# Patient Record
Sex: MALE | Race: WHITE | NOT HISPANIC OR LATINO | URBAN - METROPOLITAN AREA
[De-identification: names, ages, dates, MRNs, and addresses within clinical notes are randomized per-mention and may not be internally consistent; named-entity substitution may affect disease eponyms.]

---

## 2017-01-01 ENCOUNTER — AMBULATORY - HEALTHEAST (OUTPATIENT)
Dept: CARDIAC REHAB | Facility: HOSPITAL | Age: 82
End: 2017-01-01

## 2017-03-30 ENCOUNTER — OFFICE VISIT - HEALTHEAST (OUTPATIENT)
Dept: GERIATRICS | Facility: CLINIC | Age: 82
End: 2017-03-30

## 2017-03-30 DIAGNOSIS — R33.9 URINARY RETENTION: ICD-10-CM

## 2017-03-30 DIAGNOSIS — J44.1 COPD EXACERBATION (H): ICD-10-CM

## 2017-03-30 DIAGNOSIS — J18.9 PNEUMONIA: ICD-10-CM

## 2017-03-30 DIAGNOSIS — J10.1 INFLUENZA B: ICD-10-CM

## 2017-03-30 DIAGNOSIS — N39.0 LOWER URINARY TRACT INFECTIOUS DISEASE: ICD-10-CM

## 2017-04-03 ENCOUNTER — OFFICE VISIT - HEALTHEAST (OUTPATIENT)
Dept: GERIATRICS | Facility: CLINIC | Age: 82
End: 2017-04-03

## 2017-04-03 DIAGNOSIS — Z93.59 CHRONIC SUPRAPUBIC CATHETER (H): ICD-10-CM

## 2017-04-03 DIAGNOSIS — J44.9 COPD (CHRONIC OBSTRUCTIVE PULMONARY DISEASE) (H): ICD-10-CM

## 2017-04-03 DIAGNOSIS — I10 ESSENTIAL HYPERTENSION: ICD-10-CM

## 2017-04-03 DIAGNOSIS — J18.9 PNEUMONIA: ICD-10-CM

## 2017-04-06 ENCOUNTER — OFFICE VISIT - HEALTHEAST (OUTPATIENT)
Dept: GERIATRICS | Facility: CLINIC | Age: 82
End: 2017-04-06

## 2017-04-06 DIAGNOSIS — J44.9 COPD (CHRONIC OBSTRUCTIVE PULMONARY DISEASE) (H): ICD-10-CM

## 2017-04-06 DIAGNOSIS — J10.1 INFLUENZA B: ICD-10-CM

## 2017-04-06 DIAGNOSIS — J18.9 PNEUMONIA: ICD-10-CM

## 2017-04-06 DIAGNOSIS — N39.0 LOWER URINARY TRACT INFECTIOUS DISEASE: ICD-10-CM

## 2017-04-06 DIAGNOSIS — I10 ESSENTIAL HYPERTENSION: ICD-10-CM

## 2017-04-10 ENCOUNTER — AMBULATORY - HEALTHEAST (OUTPATIENT)
Dept: GERIATRICS | Facility: CLINIC | Age: 82
End: 2017-04-10

## 2018-01-01 ENCOUNTER — RECORDS - HEALTHEAST (OUTPATIENT)
Dept: ADMINISTRATIVE | Facility: OTHER | Age: 83
End: 2018-01-01

## 2018-01-01 ENCOUNTER — INFUSION - HEALTHEAST (OUTPATIENT)
Dept: INFUSION THERAPY | Facility: HOSPITAL | Age: 83
End: 2018-01-01

## 2018-01-01 ENCOUNTER — AMBULATORY - HEALTHEAST (OUTPATIENT)
Dept: CARDIAC REHAB | Facility: HOSPITAL | Age: 83
End: 2018-01-01

## 2018-01-01 ENCOUNTER — HOSPITAL ENCOUNTER (OUTPATIENT)
Dept: ULTRASOUND IMAGING | Facility: HOSPITAL | Age: 83
Discharge: HOME | End: 2018-05-03
Admitting: RADIOLOGY

## 2018-01-01 ENCOUNTER — HOME CARE/HOSPICE - HEALTHEAST (OUTPATIENT)
Dept: HOSPICE | Facility: HOSPICE | Age: 83
End: 2018-01-01

## 2018-01-01 ENCOUNTER — HOSPITAL ENCOUNTER (OUTPATIENT)
Dept: ULTRASOUND IMAGING | Facility: HOSPITAL | Age: 83
Discharge: HOME | End: 2018-02-19
Attending: INTERNAL MEDICINE

## 2018-01-01 ENCOUNTER — COMMUNICATION - HEALTHEAST (OUTPATIENT)
Dept: CARE COORDINATION | Facility: HOSPITAL | Age: 83
End: 2018-01-01

## 2018-01-01 ENCOUNTER — HOSPITAL ENCOUNTER (OUTPATIENT)
Dept: ULTRASOUND IMAGING | Facility: HOSPITAL | Age: 83
Discharge: HOME | End: 2018-03-16
Admitting: RADIOLOGY

## 2018-01-01 DIAGNOSIS — N18.4 ANEMIA IN STAGE 4 CHRONIC KIDNEY DISEASE (H): ICD-10-CM

## 2018-01-01 DIAGNOSIS — J90 PLEURAL EFFUSION: ICD-10-CM

## 2018-01-01 DIAGNOSIS — I25.10 CAD (CORONARY ARTERY DISEASE): ICD-10-CM

## 2018-01-01 DIAGNOSIS — C61 PROSTATE CANCER (H): ICD-10-CM

## 2018-01-01 DIAGNOSIS — D63.1 ANEMIA IN STAGE 4 CHRONIC KIDNEY DISEASE (H): ICD-10-CM

## 2018-01-01 DIAGNOSIS — N18.4 CKD (CHRONIC KIDNEY DISEASE), STAGE IV (H): ICD-10-CM

## 2018-01-01 LAB
APPEARANCE FLD: ABNORMAL
BACTERIA SPEC CULT: NO GROWTH
COLOR FLD: YELLOW
EOSINOPHIL NFR FLD MANUAL: ABNORMAL %
GLUCOSE FLD-MCNC: 129 MG/DL
GRAM STAIN RESULT: NORMAL
GRAM STAIN RESULT: NORMAL
INR PPP: 1.06 (ref 0.9–1.1)
INR PPP: 1.07 (ref 0.9–1.1)
LAB AP CHARGES (HE HISTORICAL CONVERSION): NORMAL
LAB MED GENERAL PATH INTERP (HE HISTORICAL CONVERSION): NORMAL
LDH FLD L TO P-CCNC: 105 U/L
LYMPHOCYTES NFR FLD MANUAL: 12 %
MACROPHAGE % - HISTORICAL: 14 %
MESOTHELIALS, FLUID: 18 %
MONOCYTE % - HISTORICAL: 1 %
NEUTS BAND NFR FLD MANUAL: 55 %
OTHER CELLS FLD MANUAL: ABNORMAL %
PATH REPORT.COMMENTS IMP SPEC: NORMAL
PATH REPORT.COMMENTS IMP SPEC: NORMAL
PATH REPORT.FINAL DX SPEC: NORMAL
PATH REPORT.MICROSCOPIC SPEC OTHER STN: NORMAL
PATH REPORT.RELEVANT HX SPEC: NORMAL
PLATELET # BLD AUTO: 205 THOU/UL (ref 140–440)
PLATELET # BLD AUTO: 331 THOU/UL (ref 140–440)
PROT FLD-MCNC: 3.1 G/DL
RBC FLUID - HISTORICAL: ABNORMAL /UL
SPECIMEN DESCRIPTION: NORMAL
WBC # FLD AUTO: 692 /UL (ref 0–99)

## 2018-01-01 RX ORDER — NITROGLYCERIN 0.4 MG/1
0.4 TABLET SUBLINGUAL EVERY 5 MIN PRN
Status: SHIPPED | COMMUNITY
Start: 2018-01-01

## 2021-05-25 ENCOUNTER — RECORDS - HEALTHEAST (OUTPATIENT)
Dept: ADMINISTRATIVE | Facility: CLINIC | Age: 86
End: 2021-05-25

## 2021-05-27 ENCOUNTER — RECORDS - HEALTHEAST (OUTPATIENT)
Dept: ADMINISTRATIVE | Facility: CLINIC | Age: 86
End: 2021-05-27

## 2021-05-28 ENCOUNTER — RECORDS - HEALTHEAST (OUTPATIENT)
Dept: ADMINISTRATIVE | Facility: CLINIC | Age: 86
End: 2021-05-28

## 2021-05-31 ENCOUNTER — RECORDS - HEALTHEAST (OUTPATIENT)
Dept: ADMINISTRATIVE | Facility: CLINIC | Age: 86
End: 2021-05-31

## 2021-06-01 ENCOUNTER — RECORDS - HEALTHEAST (OUTPATIENT)
Dept: ADMINISTRATIVE | Facility: CLINIC | Age: 86
End: 2021-06-01

## 2021-06-02 ENCOUNTER — RECORDS - HEALTHEAST (OUTPATIENT)
Dept: ADMINISTRATIVE | Facility: CLINIC | Age: 86
End: 2021-06-02

## 2021-06-09 ENCOUNTER — RECORDS - HEALTHEAST (OUTPATIENT)
Dept: ADMINISTRATIVE | Facility: CLINIC | Age: 86
End: 2021-06-09

## 2021-06-09 NOTE — PROGRESS NOTES
[facility]      Facility:    [facility] [[id number]]  Code Status: FULL CODE       Chief Complaint/Reason for Visit:  Chief Complaint   Patient presents with     H & P     New admit to TCU for pneumonia, influenza, COPD, UTI. (H & P 3/30/17).       HPI:   Cayetano is a 83 y.o. male with hx HTN, COPD, CAD, smoker, presented to the ED on 3/22/17 due to fever, chills, productive cough, aches and dyspnea. Has hx COPD but does not use inhalers on regular basis. Cont to smoke cigarettes. CXR showed atelectasis or infiltrate left lung base. Mild fibroatelectasis right base. influenza B positive. Started on Tamiflu. Had tachycardia but no hypotension. Pro calcitonin was elevated. He received IVF's. Treated with Ceftriaxone and Levaquin for sepsis secondary to CAP, UTI. [place] to have COPD exacerbation. Given duonebs and started on steroids. He missed his appt for suprapubic catheter change and was seen by urology for change of catheter in the hospital. He had acute pulmonary edema due to IVFs which improved with IV Lasix. He completed 5 days of tamiflu and needs a few more days of oral antibiotics and prednisone at hospital discharge. UC grew enterococcus. Electrolyte imbalances (low mag, low potassium, low sodium) improved or resolved. Overall stabilized and discharged to TCU on 3/28/17 for PT, OT, nursing cares, medical management and monitoring.     He has been feeling short of breath with some wheezing. He did not come to the TCU with neb orders so they were reinstated today. He feels the nebs were helpful. Has not needed supplemental oxygen. Feels tired, is working with therapy. No chest pain or dizziness. Appetite is fair. His wife is here visiting. Patient denies headache, fever, nausea, vomiting. No diarrhea or constipation. No abdominal pain. He is using nicotine patch. No problems with SP catheter. No new vision or hearing problems.       Past Medical History:  Past  Medical History:   Diagnosis Date     CAD (coronary artery disease)      COPD (chronic obstructive pulmonary disease)      Dyslipidemia      Hypertension      MI (myocardial infarction) 2005     Prediabetes            Surgical History:  Past Surgical History:   Procedure Laterality Date     CORONARY ANGIOPLASTY WITH STENT PLACEMENT         Family History:   Family History   Problem Relation Age of Onset     Family history unknown: Yes       Social History:    Social History     Social History     Marital status:      Spouse name: N/A     Number of children: N/A     Years of education: N/A     Social History Main Topics     Smoking status: Current Every Day Smoker     Smokeless tobacco: Never Used     Alcohol use No     Drug use: No     Sexual activity: Not on file     Other Topics Concern     Not on file     Social History Narrative    2/16/2016 - .        Medications:  Current Outpatient Prescriptions   Medication Sig     amLODIPine (NORVASC) 10 MG tablet Take 10 mg by mouth daily.      aspirin 81 MG EC tablet Take 81 mg by mouth daily.     atorvastatin (LIPITOR) 80 MG tablet Take 80 mg by mouth bedtime.      bicalutamide (CASODEX) 50 MG tablet Take 50 mg by mouth daily.     calcium, as carbonate, (OS-ESEQUIEL) 500 mg calcium (1,250 mg) tablet Take 1 tablet by mouth daily.     cholecalciferol, vitamin D3, 1,000 unit tablet Take 1,000 Units by mouth daily.     clopidogrel (PLAVIX) 75 mg tablet Take 75 mg by mouth daily.      dextroamphetamine (DEXEDRINE SPANSULE) 10 MG 24 hr capsule Take 2 capsules (20 mg total) by mouth 2 (two) times a day.     enalapril (VASOTEC) 20 MG tablet Take 20 mg by mouth 2 (two) times a day.      folic acid (FOLVITE) 1 MG tablet Take 1 mg by mouth daily.     furosemide (LASIX) 20 MG tablet Take 1 tablet (20 mg total) by mouth daily for 7 days. Hold if SBP<100     hydrocortisone with aloe 1 % Crea cream Apply to affected area 2 times daily (Patient taking differently: Apply 1  application topically 2 (two) times a day as needed. Apply to affected area 2 times daily)     magnesium chloride (SLOW-MAG) 64 mg TbEC delayed-release tablet Take 1 tablet (64 mg total) by mouth daily.     metoprolol (LOPRESSOR) 50 MG tablet Take 50 mg by mouth 2 (two) times a day.      nicotine (NICODERM CQ) 14 mg/24 hr Place 1 patch on the skin daily.     pantoprazole (PROTONIX) 40 MG tablet Take 40 mg by mouth daily.      senna-docusate (PERICOLACE) 8.6-50 mg tablet Take 1 tablet by mouth daily as needed for constipation. Do not administer if loose stools.     sulfaSALAzine (AZULFIDINE) 500 mg tablet Take 1,500 mg by mouth 2 (two) times a day with meals.     tamsulosin (FLOMAX) 0.4 mg Cp24 Take 0.8 mg by mouth bedtime.        Allergies:  No Known Allergies      Review of Systems:  Pertinent items are noted in HPI.      Physical Exam:   General: Patient is alert, elderly pleasant male, no distress.   Vitals: /80, Temp 97.9, Pulse 78, RR 18, O2 sat 95% RA.  HEENT: Head is NCAT. Eyes show no injection or icterus. Nares negative. Oropharynx well hydrated.  Neck: Supple. No tenderness or adenopathy. No JVD.  Lungs: Occ exp wheezes. Cough with rhonchi.  Cardiovascular: Regular rate and rhythm, normal S1, S2.  Back: No spinal or CVA tenderness.  Abdomen: Soft, no tenderness on exam. Bowel sounds present. No guarding rebound or rigidity.  : Catheter.  Extremities: No edema is noted.  Musculoskeletal: Age related degen changes.   Skin: No rashes.   Psych: Mood appears good.      Labs:  3/22/17 (Hosp admit): wbc 13.6, hgb 13.8, plts 196, Na 132, K 4.5, BUN 20, Cr 1.06      Assessment/Plan:  1. Pneumonia. Last day of Levaquin is today.  2. Influenza B. He completed Tamiflu.  3. COPD Exac. Will finish Prednisone. Add duoneb TID. Oxygen if needed.  4. Neurogenic bladder. Chronic SP cath.  5. UTI. As above, finish abx.  6. HTN. On Amlodipine, Enalapril and Metoprolol.  7. HLD. Cont home Atorvastatin.  8. Prostate  cancer. Mets to bone.  9. CAD. On aspirin, statin, beta blocker. Also Plavix, hx stent.  10. Tobacco dependence. Currently on nicotine patch.  11. Code status is full code.      Total time greater than 60 minutes, greater than 50% counseling and coordination of care, time spent in interview and examination of patient, review of records, discussion with nursing staff.      Electronically signed by: Sonia Paul MD

## 2021-06-09 NOTE — PROGRESS NOTES
[facility]    Facility:   [facility] [[id number]]   Code Status: FULL CODE  PCP: Almas Ruffin MD   Phone: [phone]   Fax: [phone]      CHIEF COMPLAINT/REASON FOR VISIT:  Chief Complaint   Patient presents with     Discharge Summary     [facility] TCU 3/28/17-4/7/17.       HISTORY COURSE:  Cayetano is a 83 y.o. male with hx HTN, COPD, CAD, smoker, presented to the ED on 3/22/17 due to fever, chills, productive cough, aches and dyspnea. Has hx COPD but does not use inhalers on regular basis. Cont to smoke cigarettes. CXR showed atelectasis or infiltrate left lung base. Mild fibroatelectasis right base. influenza B positive. Started on Tamiflu. Had tachycardia but no hypotension. Pro calcitonin was elevated. He received IVF's. Treated with Ceftriaxone and Levaquin for sepsis secondary to CAP, UTI. [place] to have COPD exacerbation. Given duonebs and started on steroids. He missed his appt for suprapubic catheter change and was seen by urology for change of catheter in the hospital. He had acute pulmonary edema due to IVFs which improved with IV Lasix. He completed 5 days of tamiflu and needs a few more days of oral antibiotics and prednisone at hospital discharge. UC grew enterococcus. Electrolyte imbalances (low mag, low potassium, low sodium) improved or resolved. Overall stabilized and discharged to TCU on 3/28/17 for PT, OT, nursing cares, medical management and monitoring.     No complications during his TCU stay. Initially weak, progressed nicely with therapy. Initially needed scheduled nebs, finished antibiotics and prednisone, duo nebs changed to prn and needed just occasionally. He will benefit from nebulizer at home as needed. Has not needed oxygen. No med changes to usual home meds. Ready to discharge home tomorrow Fri 4/7/17 and has declined home services.      PHYSICAL EXAM:   General: Patient is alert, elderly pleasant male, no distress.   Vitals: BP  124/60, Temp 97.3, Pulse 60, RR 20, O2 sat 92% RA.  HEENT: Head is NCAT. Eyes show no injection or icterus. Nares negative. Oropharynx well hydrated.  Neck: Supple. No tenderness or adenopathy. No JVD.  Lungs: Clear bilaterally. No wheezes.  Cardiovascular: Regular rate and rhythm, normal S1, S2.  Back: No spinal or CVA tenderness.  Abdomen: Soft, no tenderness on exam. Bowel sounds present. No guarding rebound or rigidity.  : SP Catheter.  Extremities: No edema is noted.  Musculoskeletal: Age related degen changes.   Psych: Mood appears good.      MEDICATION LIST:  Current Outpatient Prescriptions   Medication Sig     amLODIPine (NORVASC) 10 MG tablet Take 10 mg by mouth daily.      aspirin 81 MG EC tablet Take 81 mg by mouth daily.     atorvastatin (LIPITOR) 80 MG tablet Take 80 mg by mouth bedtime.      bicalutamide (CASODEX) 50 MG tablet Take 50 mg by mouth daily.     calcium, as carbonate, (OS-ESEQUIEL) 500 mg calcium (1,250 mg) tablet Take 1 tablet by mouth daily.     cholecalciferol, vitamin D3, 1,000 unit tablet Take 1,000 Units by mouth daily.     clopidogrel (PLAVIX) 75 mg tablet Take 75 mg by mouth daily.      dextroamphetamine (DEXEDRINE SPANSULE) 10 MG 24 hr capsule Take 2 capsules (20 mg total) by mouth 2 (two) times a day.     enalapril (VASOTEC) 20 MG tablet Take 20 mg by mouth 2 (two) times a day.      folic acid (FOLVITE) 1 MG tablet Take 1 mg by mouth daily.     hydrocortisone with aloe 1 % Crea cream Apply to affected area 2 times daily (Patient taking differently: Apply 1 application topically 2 (two) times a day as needed. Apply to affected area 2 times daily)     magnesium chloride (SLOW-MAG) 64 mg TbEC delayed-release tablet Take 1 tablet (64 mg total) by mouth daily.     metoprolol (LOPRESSOR) 50 MG tablet Take 50 mg by mouth 2 (two) times a day.      nicotine (NICODERM CQ) 14 mg/24 hr Place 1 patch on the skin daily.     pantoprazole (PROTONIX) 40 MG tablet Take 40 mg by mouth daily.       senna-docusate (PERICOLACE) 8.6-50 mg tablet Take 1 tablet by mouth daily as needed for constipation. Do not administer if loose stools.     sulfaSALAzine (AZULFIDINE) 500 mg tablet Take 1,500 mg by mouth 2 (two) times a day with meals.     tamsulosin (FLOMAX) 0.4 mg Cp24 Take 0.8 mg by mouth bedtime.        DISCHARGE DIAGNOSIS:  1. COPD.  2. Pneumonia. Resolved.  3. Influenza B. Resolved.  4. HTN.  5. UTI. Resolved.  6. Neurogenic bladder. Chronic SP catheter.  7. HLD.  8. Hx prostate cancer.    Total time greater than 30 minutes discharge coordination.      MEDICAL EQUIPMENT NEEDS:  None.      DISCHARGE PLAN/FACE TO FACE:  I certify that services are/were furnished while this patient was under the care of a physician and that a physician or an allowed non-physician practitioner (NPP), had a face-to-face encounter that meets the physician face-to-face encounter requirements. The encounter was in whole, or in part, related to the primary reason for home health. The patient is confined to his/her home and needs intermittent skilled nursing, physical therapy, speech-language pathology, or the continued need for occupational therapy. A plan of care has been established by a physician and is periodically reviewed by a physician.    Date of Face-to-Face Encounter: 4/6/17.    I certify that, based on my findings, the following services are medically necessary home health services: Declined home services.    My clinical findings support the need for the above skilled services because: NA.    This patient is homebound because: NA.    The patient is, or has been, under my care and I have initiated the establishment of the plan of care. This patient will be followed by a physician who will periodically review the plan of care.      Electronically signed by: Sonia Paul MD

## 2021-06-09 NOTE — PROGRESS NOTES
[facility]    Facility:   [facility] [[id number]]   Code Status: FULL CODE       Chief Complaint   Patient presents with     Follow Up     TCU 4/3/17.       HPI:   Cayetano is a 83 y.o. male with hx HTN, COPD, CAD, smoker, presented to the ED on 3/22/17 due to fever, chills, productive cough, aches and dyspnea. Has hx COPD but does not use inhalers on regular basis. Cont to smoke cigarettes. CXR showed atelectasis or infiltrate left lung base. Mild fibroatelectasis right base. influenza B positive. Started on Tamiflu. Had tachycardia but no hypotension. Pro calcitonin was elevated. He received IVF's. Treated with Ceftriaxone and Levaquin for sepsis secondary to CAP, UTI. [place] to have COPD exacerbation. Given duonebs and started on steroids. He missed his appt for suprapubic catheter change and was seen by urology for change of catheter in the hospital. He had acute pulmonary edema due to IVFs which improved with IV Lasix. He completed 5 days of tamiflu and needs a few more days of oral antibiotics and prednisone at hospital discharge. UC grew enterococcus. Electrolyte imbalances (low mag, low potassium, low sodium) improved or resolved. Overall stabilized and discharged to TCU on 3/28/17 for PT, OT, nursing cares, medical management and monitoring.       Past Medical History:  Past Medical History:   Diagnosis Date     CAD (coronary artery disease)      COPD (chronic obstructive pulmonary disease)      Dyslipidemia      Hypertension      MI (myocardial infarction) 2005     Prediabetes        Subjective:  He feels much improved from last visit. Thinks the nebs have really helped. Doesn't think he needs them scheduled anymore. Wonders about having neb machine at home. He has COPD, is not routinely on steroids or oxygen, in the past has had as-needed inhalers but states it has been such a long time since he needed to use. This hospitalization, COPD likely triggered by  respiratory infections of pneumonia and influenza but he may benefit from having neb at home for times when COPD flares up. He is a smoker, currently on nicotine patch. He denies shortness of breath with activity. No wheezing. Ambulating with walker though previously did not use assistive device per wife. He denies chest pain. No dizziness. Appetite is good. No abdominal pain, diarrhea or constipation.      Physical Exam:   General: Patient is alert, elderly pleasant male, no distress.   Vitals: /56, Temp 97.3, Pulse 72, RR 20, O2 sat 93% RA.  HEENT: Head is NCAT. Eyes show no injection or icterus. Nares negative. Oropharynx well hydrated.  Neck: Supple. No tenderness or adenopathy. No JVD.  Lungs: Clear bilaterally. No wheezes.  Cardiovascular: Regular rate and rhythm, normal S1, S2.  Back: No spinal or CVA tenderness.  Abdomen: Soft, no tenderness on exam. Bowel sounds present. No guarding rebound or rigidity.  : SP Catheter.  Extremities: No edema is noted.  Musculoskeletal: Age related degen changes.   Psych: Mood appears good.      Labs:  3/22/17 (Hosp admit): wbc 13.6, hgb 13.8, plts 196, Na 132, K 4.5, BUN 20, Cr 1.06      Assessment/Plan:  1. COPD. Improved. Will change nebs to prn. He is done with prednisone. Does not use inhalers regularly at home.  2. Pneumonia. Done with Levaquin. Influenza. Completed Tamiflu in the hospital.  3. HTN. BPs acceptable.  4. Neurogenic bladder. Has SP catheter long term.  5. Deconditioning. Continues to work with therapy for strengthening, gait, stamina.        Electronically signed by: Sonia Paul MD

## 2021-06-15 PROBLEM — E87.20 METABOLIC ACIDOSIS: Status: ACTIVE | Noted: 2017-03-23

## 2021-06-15 PROBLEM — Z93.59 CHRONIC SUPRAPUBIC CATHETER (H): Status: ACTIVE | Noted: 2017-03-23

## 2021-06-15 PROBLEM — I10 ESSENTIAL HYPERTENSION: Status: ACTIVE | Noted: 2017-03-23

## 2021-06-15 PROBLEM — A41.9 SEPSIS (H): Status: ACTIVE | Noted: 2017-03-23

## 2021-06-15 PROBLEM — J10.1 INFLUENZA B: Status: ACTIVE | Noted: 2017-03-23

## 2021-06-15 PROBLEM — C61 PROSTATE CANCER (H): Status: ACTIVE | Noted: 2017-03-23

## 2021-06-15 PROBLEM — J18.9 PNEUMONIA: Status: ACTIVE | Noted: 2017-03-23

## 2021-06-16 PROBLEM — R06.02 SHORTNESS OF BREATH: Status: ACTIVE | Noted: 2018-01-01

## 2021-06-16 PROBLEM — N17.9 AKI (ACUTE KIDNEY INJURY) (H): Status: ACTIVE | Noted: 2018-01-01

## 2021-06-16 PROBLEM — I25.10 CORONARY ARTERY DISEASE DUE TO LIPID RICH PLAQUE: Status: ACTIVE | Noted: 2018-01-01

## 2021-06-16 PROBLEM — E44.0 MODERATE PROTEIN-CALORIE MALNUTRITION (H): Status: ACTIVE | Noted: 2018-01-01

## 2021-06-16 PROBLEM — E43 PROTEIN-CALORIE MALNUTRITION, SEVERE (H): Status: ACTIVE | Noted: 2018-01-01

## 2021-06-16 PROBLEM — D63.1 ANEMIA IN CHRONIC KIDNEY DISEASE: Status: ACTIVE | Noted: 2018-01-01

## 2021-06-16 PROBLEM — I25.83 CORONARY ARTERY DISEASE DUE TO LIPID RICH PLAQUE: Status: ACTIVE | Noted: 2018-01-01

## 2021-06-16 PROBLEM — Z51.5 PALLIATIVE CARE ENCOUNTER: Status: ACTIVE | Noted: 2018-01-01

## 2021-06-16 PROBLEM — R63.0 ANOREXIA: Status: ACTIVE | Noted: 2018-01-01

## 2021-06-16 PROBLEM — N18.9 ANEMIA IN CHRONIC KIDNEY DISEASE: Status: ACTIVE | Noted: 2018-01-01

## 2021-06-17 NOTE — PROGRESS NOTES
"Arrived ambulatory at 11:20, seated in chair 3 - and is accompanied by his spouse. Reviewed plan of care and today's treatment - \"I did fine after the last infusion - did not feel sick at all.\" Placed IV easily in the LAC and used NS as the primary IV solution - and infused the 2nd dose of feraheme over 15 minutes. The IV line was flushed with  mls over 30 minutes. VSS throughout. Pt drank 240 mls of juice while resting in the recliner. Post infusion AVS was given and explained to the patient. Left unit ambulatory at 13:08 in stable condition - instructed to follow up with Dr. Parada with any questions or concerns, \"I see him in 6 months.\"    Anay Armando RN  "

## 2021-06-17 NOTE — PROGRESS NOTES
Pt arrived amb with his wife, Went over today's plan of care, feraheme, side effects nad follow up, arm on, iv started, pt talked about that he has bugs, seen it on his skin, and  Under a microscope, wife says she hasn't seen any bugs on him nor feels like she has any, Pt has scabbed area on his arms from itching per the pt,does not have itching elsewhere.  did not see anything more, Iv infused with feraheme belen well, pt was observed for 1/hr after, BP stable, no signs of reactions,IV was flushed with ns then dcd after, Pt given his AVS we talked about the poss bugs ,and to see his primary MD if he thinks he has them. Pt left at 1315 with his wife  Sasha Simmons